# Patient Record
Sex: FEMALE | Race: WHITE | URBAN - NONMETROPOLITAN AREA
[De-identification: names, ages, dates, MRNs, and addresses within clinical notes are randomized per-mention and may not be internally consistent; named-entity substitution may affect disease eponyms.]

---

## 2023-06-05 ENCOUNTER — OFFICE VISIT (OUTPATIENT)
Age: 23
End: 2023-06-05
Payer: MEDICAID

## 2023-06-05 VITALS — WEIGHT: 228 LBS | TEMPERATURE: 97.5 F

## 2023-06-05 DIAGNOSIS — H72.91 PERFORATION OF RIGHT TYMPANIC MEMBRANE: Primary | ICD-10-CM

## 2023-06-05 PROCEDURE — 99213 OFFICE O/P EST LOW 20 MIN: CPT | Performed by: NURSE PRACTITIONER

## 2023-06-05 RX ORDER — ACETAMINOPHEN AND CODEINE PHOSPHATE 120; 12 MG/5ML; MG/5ML
SOLUTION ORAL
COMMUNITY
Start: 2023-03-07

## 2023-06-05 RX ORDER — METFORMIN HYDROCHLORIDE 500 MG/1
TABLET, EXTENDED RELEASE ORAL
COMMUNITY
Start: 2023-04-06

## 2023-06-05 RX ORDER — AMOXICILLIN AND CLAVULANATE POTASSIUM 875; 125 MG/1; MG/1
1 TABLET, FILM COATED ORAL 2 TIMES DAILY
Qty: 20 TABLET | Refills: 0 | Status: SHIPPED | OUTPATIENT
Start: 2023-06-05 | End: 2023-06-15

## 2023-06-05 RX ORDER — ORAL SEMAGLUTIDE 3 MG/1
TABLET ORAL
COMMUNITY
Start: 2023-05-08

## 2023-06-05 RX ORDER — MULTIVIT-MIN/IRON/FOLIC ACID/K 18-600-40
CAPSULE ORAL
COMMUNITY

## 2023-06-05 RX ORDER — CETIRIZINE HYDROCHLORIDE 10 MG/1
TABLET ORAL
COMMUNITY
Start: 2023-03-13

## 2023-06-05 RX ORDER — LEVOTHYROXINE SODIUM 137 UG/1
TABLET ORAL
COMMUNITY
Start: 2023-03-13

## 2023-06-05 RX ORDER — OFLOXACIN 3 MG/ML
5 SOLUTION AURICULAR (OTIC) 2 TIMES DAILY
Qty: 3.5 ML | Refills: 0 | Status: SHIPPED | OUTPATIENT
Start: 2023-06-05 | End: 2023-06-12

## 2023-06-05 ASSESSMENT — ENCOUNTER SYMPTOMS
VOICE CHANGE: 0
CHOKING: 0
CHEST TIGHTNESS: 0
WHEEZING: 0
COLOR CHANGE: 0
COUGH: 0
TROUBLE SWALLOWING: 0
STRIDOR: 0
SHORTNESS OF BREATH: 0
RHINORRHEA: 0
SINUS PRESSURE: 0
SORE THROAT: 0
EYES NEGATIVE: 1
GASTROINTESTINAL NEGATIVE: 1

## 2023-06-05 NOTE — PROGRESS NOTES
Not on File    Health Maintenance   Topic Date Due    Varicella vaccine (1 of 2 - 2-dose childhood series) Never done    HPV vaccine (1 - 2-dose series) Never done    Depression Screen  Never done    HIV screen  Never done    Hepatitis C screen  Never done    DTaP/Tdap/Td vaccine (1 - Tdap) Never done    Pap smear  Never done    COVID-19 Vaccine (3 - Booster for Pfizer series) 03/15/2022    Flu vaccine (Season Ended) 08/01/2023    Hepatitis A vaccine  Aged Out    Hib vaccine  Aged Out    Meningococcal (ACWY) vaccine  Aged Out    Pneumococcal 0-64 years Vaccine  Aged Out       Subjective:     Review of Systems   Constitutional: Negative. Negative for activity change, appetite change, fatigue and fever. HENT:  Positive for ear discharge, ear pain and hearing loss. Negative for congestion, rhinorrhea, sinus pressure, sore throat, trouble swallowing and voice change. Eyes: Negative. Respiratory:  Negative for cough, choking, chest tightness, shortness of breath, wheezing and stridor. Cardiovascular: Negative. Gastrointestinal: Negative. Endocrine: Negative. Genitourinary: Negative. Musculoskeletal: Negative. Skin:  Negative for color change. Neurological: Negative. Hematological: Negative. Psychiatric/Behavioral: Negative. Objective:     Physical Exam  Vitals and nursing note reviewed. Constitutional:       General: She is not in acute distress. Appearance: Normal appearance. She is well-developed. She is not ill-appearing or toxic-appearing. HENT:      Head: Normocephalic and atraumatic. Right Ear: Hearing, ear canal and external ear normal. Tympanic membrane is perforated. Left Ear: Hearing, tympanic membrane, ear canal and external ear normal.      Nose: Nose normal.      Right Sinus: No maxillary sinus tenderness or frontal sinus tenderness. Left Sinus: No maxillary sinus tenderness or frontal sinus tenderness. Mouth/Throat:      Lips: Pink.

## 2023-06-06 ENCOUNTER — OFFICE VISIT (OUTPATIENT)
Dept: ENT CLINIC | Age: 23
End: 2023-06-06
Payer: MEDICAID

## 2023-06-06 VITALS
BODY MASS INDEX: 45.76 KG/M2 | SYSTOLIC BLOOD PRESSURE: 188 MMHG | WEIGHT: 227 LBS | DIASTOLIC BLOOD PRESSURE: 66 MMHG | HEIGHT: 59 IN

## 2023-06-06 DIAGNOSIS — H61.23 BILATERAL IMPACTED CERUMEN: Primary | ICD-10-CM

## 2023-06-06 PROCEDURE — 69210 REMOVE IMPACTED EAR WAX UNI: CPT | Performed by: PHYSICIAN ASSISTANT

## 2023-06-06 PROCEDURE — 99203 OFFICE O/P NEW LOW 30 MIN: CPT | Performed by: PHYSICIAN ASSISTANT

## 2023-06-06 ASSESSMENT — ENCOUNTER SYMPTOMS
RHINORRHEA: 0
SORE THROAT: 0
FACIAL SWELLING: 0
EYE DISCHARGE: 0
TROUBLE SWALLOWING: 0
SINUS PRESSURE: 0
SINUS PAIN: 0
EYE PAIN: 0
VOICE CHANGE: 0

## 2023-06-06 NOTE — PROGRESS NOTES
hearing loss, nosebleeds, postnasal drip, rhinorrhea, sinus pressure, sinus pain, sneezing, sore throat, tinnitus, trouble swallowing and voice change. Eyes:  Negative for pain and discharge. Neurological:  Negative for dizziness and headaches. Comments:     PHYSICAL EXAM:    BP (!) 188/66   Ht 4' 11\" (1.499 m)   Wt 227 lb (103 kg)   BMI 45.85 kg/m²   Body mass index is 45.85 kg/m². General Appearance: well developed  and well nourished  Head/ Face: normocephalic and atraumatic  Vocal Quality: good/ normal  Ears: Right Ear: External: external ears normal Otoscopy Ear Canal: cerumen impaction Otoscopy TM: TM's normal and TM's mobile Left Ear: External: external ears normal Otoscopy Ear Canal: cerumen Otoscopy TM: TM's normal and TM's mobile  Hearing: grossly intact  Nose: nares normal and septum midline  Neck: supple and adenopathy none palpable  Thyroid: normal  Oral exam demonstrated the tongue to be midline with no abnormalities to the posterior pharynx. Assessment & Plan:    Problem List Items Addressed This Visit       Bilateral impacted cerumen - Primary     Bilateral cerumen impaction-successfully removed with microscopic guidance-no evidence of a TM perforation noted after disimpaction  Plan: I recommended the patient to see me every 6 months for cerumen check due to her history. They were reminded to call if they have any questions or problems. Due to the patient having no evidence of a perforation, I advised the patient that they can stop the antibiotic therapy. Relevant Orders    94107 - [place] REMOVE IMPACTED EAR WAX (Completed)       Orders Placed This Encounter   Procedures    80930 - [place] REMOVE IMPACTED EAR WAX     With microscopic guidance, the cerumen impaction was removed from the right ear with suction without any complications after disimpaction there was no evidence of a TM perforation or infection.   Examination left ear demonstrated cerumen to be present that was

## 2023-06-06 NOTE — ASSESSMENT & PLAN NOTE
Bilateral cerumen impaction-successfully removed with microscopic guidance-no evidence of a TM perforation noted after disimpaction  Plan: I recommended the patient to see me every 6 months for cerumen check due to her history. They were reminded to call if they have any questions or problems. Due to the patient having no evidence of a perforation, I advised the patient that they can stop the antibiotic therapy.

## 2023-06-19 ENCOUNTER — TELEPHONE (OUTPATIENT)
Dept: ENT CLINIC | Age: 23
End: 2023-06-19

## 2023-06-19 NOTE — TELEPHONE ENCOUNTER
Patient parent Laurie Torres requesting return call from nurse. Stated patient is still having issue with right ear. Please return her call to discuss [phone]    Thank you         I called and left a message on Gita's cell phone voicemail that if she is still having issues with her ear she can utilize the antibiotic drops that was previously prescribed by urgent care. Overall she had a straightforward cerumen disimpaction and was noted with no infection at the time. She was reminded to call if she has any questions or problems.       Electronically signed by Jeff Davis PA-C on 6/19/23 at 6:56 PM CDT

## 2023-12-04 ENCOUNTER — TRANSCRIBE ORDERS (OUTPATIENT)
Dept: ADMINISTRATIVE | Facility: HOSPITAL | Age: 23
End: 2023-12-04
Payer: COMMERCIAL

## 2023-12-04 ENCOUNTER — LAB (OUTPATIENT)
Dept: LAB | Facility: HOSPITAL | Age: 23
End: 2023-12-04
Payer: COMMERCIAL

## 2023-12-04 DIAGNOSIS — D75.1 POLYCYTHEMIA, SECONDARY: ICD-10-CM

## 2023-12-04 DIAGNOSIS — E88.819 INSULIN RESISTANCE: ICD-10-CM

## 2023-12-04 DIAGNOSIS — Z00.00 ROUTINE GENERAL MEDICAL EXAMINATION AT A HEALTH CARE FACILITY: ICD-10-CM

## 2023-12-04 DIAGNOSIS — E11.9 DIABETES MELLITUS WITHOUT COMPLICATION: ICD-10-CM

## 2023-12-04 DIAGNOSIS — Z00.00 ROUTINE GENERAL MEDICAL EXAMINATION AT A HEALTH CARE FACILITY: Primary | ICD-10-CM

## 2023-12-04 DIAGNOSIS — E55.9 VITAMIN D DEFICIENCY DISEASE: ICD-10-CM

## 2023-12-04 LAB
25(OH)D3 SERPL-MCNC: 54.9 NG/ML (ref 30–100)
ALBUMIN SERPL-MCNC: 3.8 G/DL (ref 3.5–5.2)
ALBUMIN UR-MCNC: 1.7 MG/DL
ALBUMIN/GLOB SERPL: 1.2 G/DL
ALP SERPL-CCNC: 104 U/L (ref 39–117)
ALT SERPL W P-5'-P-CCNC: 17 U/L (ref 1–33)
ANION GAP SERPL CALCULATED.3IONS-SCNC: 8 MMOL/L (ref 5–15)
AST SERPL-CCNC: 20 U/L (ref 1–32)
BASOPHILS # BLD AUTO: 0.04 10*3/MM3 (ref 0–0.2)
BASOPHILS NFR BLD AUTO: 1.4 % (ref 0–1.5)
BILIRUB SERPL-MCNC: 0.5 MG/DL (ref 0–1.2)
BUN SERPL-MCNC: 15 MG/DL (ref 6–20)
BUN/CREAT SERPL: 16.9 (ref 7–25)
CALCIUM SPEC-SCNC: 9.3 MG/DL (ref 8.6–10.5)
CHLORIDE SERPL-SCNC: 102 MMOL/L (ref 98–107)
CHOLEST SERPL-MCNC: 175 MG/DL (ref 0–200)
CO2 SERPL-SCNC: 28 MMOL/L (ref 22–29)
CREAT SERPL-MCNC: 0.89 MG/DL (ref 0.57–1)
CREAT UR-MCNC: 190.2 MG/DL
DEPRECATED RDW RBC AUTO: 48.4 FL (ref 37–54)
EGFRCR SERPLBLD CKD-EPI 2021: 92.4 ML/MIN/1.73
EOSINOPHIL # BLD AUTO: 0.02 10*3/MM3 (ref 0–0.4)
EOSINOPHIL NFR BLD AUTO: 0.7 % (ref 0.3–6.2)
ERYTHROCYTE [DISTWIDTH] IN BLOOD BY AUTOMATED COUNT: 13.1 % (ref 12.3–15.4)
FERRITIN SERPL-MCNC: 15.12 NG/ML (ref 13–150)
GLOBULIN UR ELPH-MCNC: 3.3 GM/DL
GLUCOSE SERPL-MCNC: 89 MG/DL (ref 65–99)
HBA1C MFR BLD: 5.3 % (ref 4.8–5.6)
HCT VFR BLD AUTO: 47.9 % (ref 34–46.6)
HDLC SERPL-MCNC: 49 MG/DL (ref 40–60)
HGB BLD-MCNC: 15.3 G/DL (ref 12–15.9)
IMM GRANULOCYTES # BLD AUTO: 0.01 10*3/MM3 (ref 0–0.05)
IMM GRANULOCYTES NFR BLD AUTO: 0.3 % (ref 0–0.5)
IRON 24H UR-MRATE: 102 MCG/DL (ref 37–145)
IRON SATN MFR SERPL: 23 % (ref 20–50)
LDLC SERPL CALC-MCNC: 113 MG/DL (ref 0–100)
LDLC/HDLC SERPL: 2.3 {RATIO}
LYMPHOCYTES # BLD AUTO: 0.89 10*3/MM3 (ref 0.7–3.1)
LYMPHOCYTES NFR BLD AUTO: 30.9 % (ref 19.6–45.3)
MCH RBC QN AUTO: 31.9 PG (ref 26.6–33)
MCHC RBC AUTO-ENTMCNC: 31.9 G/DL (ref 31.5–35.7)
MCV RBC AUTO: 100 FL (ref 79–97)
MICROALBUMIN/CREAT UR: 8.9 MG/G (ref 0–29)
MONOCYTES # BLD AUTO: 0.19 10*3/MM3 (ref 0.1–0.9)
MONOCYTES NFR BLD AUTO: 6.6 % (ref 5–12)
NEUTROPHILS NFR BLD AUTO: 1.73 10*3/MM3 (ref 1.7–7)
NEUTROPHILS NFR BLD AUTO: 60.1 % (ref 42.7–76)
NRBC BLD AUTO-RTO: 0 /100 WBC (ref 0–0.2)
PLATELET # BLD AUTO: 244 10*3/MM3 (ref 140–450)
PMV BLD AUTO: 9.6 FL (ref 6–12)
POTASSIUM SERPL-SCNC: 4.1 MMOL/L (ref 3.5–5.2)
PROT SERPL-MCNC: 7.1 G/DL (ref 6–8.5)
RBC # BLD AUTO: 4.79 10*6/MM3 (ref 3.77–5.28)
SODIUM SERPL-SCNC: 138 MMOL/L (ref 136–145)
TIBC SERPL-MCNC: 451 MCG/DL (ref 298–536)
TRANSFERRIN SERPL-MCNC: 303 MG/DL (ref 200–360)
TRIGL SERPL-MCNC: 67 MG/DL (ref 0–150)
TSH SERPL DL<=0.05 MIU/L-ACNC: 0.61 UIU/ML (ref 0.27–4.2)
VLDLC SERPL-MCNC: 13 MG/DL (ref 5–40)
WBC NRBC COR # BLD AUTO: 2.88 10*3/MM3 (ref 3.4–10.8)

## 2023-12-04 PROCEDURE — 80050 GENERAL HEALTH PANEL: CPT

## 2023-12-04 PROCEDURE — 82728 ASSAY OF FERRITIN: CPT

## 2023-12-04 PROCEDURE — 80061 LIPID PANEL: CPT

## 2023-12-04 PROCEDURE — 84466 ASSAY OF TRANSFERRIN: CPT

## 2023-12-04 PROCEDURE — 83036 HEMOGLOBIN GLYCOSYLATED A1C: CPT

## 2023-12-04 PROCEDURE — 82043 UR ALBUMIN QUANTITATIVE: CPT

## 2023-12-04 PROCEDURE — 36415 COLL VENOUS BLD VENIPUNCTURE: CPT

## 2023-12-04 PROCEDURE — 83525 ASSAY OF INSULIN: CPT

## 2023-12-04 PROCEDURE — 82306 VITAMIN D 25 HYDROXY: CPT

## 2023-12-04 PROCEDURE — 82570 ASSAY OF URINE CREATININE: CPT

## 2023-12-04 PROCEDURE — 82668 ASSAY OF ERYTHROPOIETIN: CPT

## 2023-12-04 PROCEDURE — 83540 ASSAY OF IRON: CPT

## 2023-12-05 LAB
EPO SERPL-ACNC: 19.2 MIU/ML (ref 2.6–18.5)
INSULIN SERPL-ACNC: 10.7 UIU/ML (ref 2.6–24.9)

## 2023-12-13 ENCOUNTER — OFFICE VISIT (OUTPATIENT)
Dept: OBSTETRICS AND GYNECOLOGY | Facility: CLINIC | Age: 23
End: 2023-12-13
Payer: COMMERCIAL

## 2023-12-13 VITALS
BODY MASS INDEX: 44.35 KG/M2 | DIASTOLIC BLOOD PRESSURE: 80 MMHG | SYSTOLIC BLOOD PRESSURE: 132 MMHG | WEIGHT: 220 LBS | HEIGHT: 59 IN

## 2023-12-13 DIAGNOSIS — N94.89 MENSTRUAL SUPPRESSION: Primary | ICD-10-CM

## 2023-12-13 DIAGNOSIS — Q90.9 DOWN SYNDROME: ICD-10-CM

## 2023-12-13 DIAGNOSIS — Z71.89 COUNSELING ON HEALTH PROMOTION AND DISEASE PREVENTION: ICD-10-CM

## 2023-12-13 RX ORDER — LEVOTHYROXINE SODIUM 137 UG/1
1 TABLET ORAL DAILY
COMMUNITY

## 2023-12-13 RX ORDER — NAPHAZOLINE HCL/GLYCERIN 0.012-0.2%
1 DROPS OPHTHALMIC (EYE)
COMMUNITY

## 2023-12-13 RX ORDER — ORAL SEMAGLUTIDE 3 MG/1
1 TABLET ORAL DAILY
COMMUNITY
Start: 2023-10-13

## 2023-12-13 RX ORDER — CETIRIZINE HYDROCHLORIDE 10 MG/1
TABLET ORAL
COMMUNITY
Start: 2023-09-13

## 2023-12-13 RX ORDER — ACETAMINOPHEN AND CODEINE PHOSPHATE 120; 12 MG/5ML; MG/5ML
0.7 SOLUTION ORAL DAILY
COMMUNITY
Start: 2023-09-13 | End: 2023-12-13 | Stop reason: SDUPTHER

## 2023-12-13 RX ORDER — ACETAMINOPHEN AND CODEINE PHOSPHATE 120; 12 MG/5ML; MG/5ML
SOLUTION ORAL
Qty: 168 TABLET | Refills: 4 | Status: SHIPPED | OUTPATIENT
Start: 2023-12-13

## 2023-12-13 RX ORDER — METFORMIN HYDROCHLORIDE 500 MG/1
1 TABLET, EXTENDED RELEASE ORAL DAILY
COMMUNITY

## 2023-12-13 RX ORDER — FLUTICASONE PROPIONATE 50 MCG
SPRAY, SUSPENSION (ML) NASAL
COMMUNITY

## 2023-12-13 NOTE — PROGRESS NOTES
Chief Complaint   Patient presents with    [facility] patient here for birth control surveillance. Patient has never had a pap. Patient is not sexually active.        History:  Cynthia Guzman is a 23 y.o. female who presents today for follow-up for evaluation of the above:  Pt comes in today for refills on birth control. Continues to do well on current regimen- has no period. Wishes to continue same. Pt is not sexually active.         ROS:  Review of Systems   Constitutional:  Negative for activity change and unexpected weight loss.   HENT:  Negative for congestion.    Cardiovascular:  Negative for chest pain.   Gastrointestinal:  Negative for blood in stool, constipation and diarrhea.   Endocrine: Negative for cold intolerance and heat intolerance.   Genitourinary:  Negative for dyspareunia, pelvic pain and vaginal discharge.   Musculoskeletal:  Negative for arthralgias, back pain, neck pain and neck stiffness.   Skin:  Negative for rash.   Neurological:  Negative for dizziness and headache.   Psychiatric/Behavioral:  Negative for sleep disturbance. The patient is not nervous/anxious.        Ms. Guzman  reports that she has never smoked. She does not have any smokeless tobacco history on file. She reports that she does not drink alcohol and does not use drugs.      Current Outpatient Medications:     cetirizine (zyrTEC) 10 MG tablet, , Disp: , Rfl:     Cholecalciferol 50 MCG (2000 UT) capsule, Take  by mouth., Disp: , Rfl:     fluticasone (FLONASE) 50 MCG/ACT nasal spray, 1 SPRAY DAILY, Disp: , Rfl:     levothyroxine (SYNTHROID, LEVOTHROID) 137 MCG tablet, 1 tablet Daily., Disp: , Rfl:     metFORMIN ER (GLUCOPHAGE-XR) 500 MG 24 hr tablet, 1 tablet Daily., Disp: , Rfl:     norethindrone (MICRONOR) 0.35 MG tablet, Take 2 tablets daily., Disp: 168 tablet, Rfl: 4    Probiotic Product (Super Probiotic) capsule, Take 1 capsule by mouth., Disp: , Rfl:     Rybelsus 3 MG tablet, 1 tablet Daily., Disp: , Rfl:  "      OBJECTIVE:  /80 (BP Location: Left arm, Patient Position: Sitting, Cuff Size: Adult)   Ht 149.9 cm (59\")   Wt 99.8 kg (220 lb)   LMP  (LMP Unknown)   BMI 44.43 kg/m²    Physical Exam  Vitals and nursing note reviewed.   Constitutional:       Appearance: She is well-developed.   HENT:      Head: Normocephalic and atraumatic.   Eyes:      General:         Right eye: No discharge.         Left eye: No discharge.      Conjunctiva/sclera: Conjunctivae normal.   Neck:      Thyroid: No thyromegaly.   Cardiovascular:      Rate and Rhythm: Normal rate and regular rhythm.      Heart sounds: Normal heart sounds. No murmur heard.  Pulmonary:      Effort: Pulmonary effort is normal.      Breath sounds: Normal breath sounds.   Musculoskeletal:      Cervical back: Normal range of motion and neck supple.   Skin:     General: Skin is warm and dry.   Neurological:      Mental Status: She is alert and oriented to person, place, and time.   Psychiatric:         Mood and Affect: Mood normal.         Behavior: Behavior normal.         Thought Content: Thought content normal.         Judgment: Judgment normal.         Assessment/Plan    Diagnoses and all orders for this visit:    1. Menstrual suppression (Primary)  -     norethindrone (MICRONOR) 0.35 MG tablet; Take 2 tablets daily.  Dispense: 168 tablet; Refill: 4    2. Down syndrome    3. Counseling on health promotion and disease prevention    Pt comes in today for refills on micronor. Has been on this for several years for menstrual suppression. Has no periods with it. Pt is not sexually active.     Pt previous GYN attempted pap a few years ago but it did not go well. Pt very anxious about this. Mother is not really wanting to make pt get pap. Educated on what pap and exam screens for. Mom states that pt is with her constantly and could not be sexually active. Discussed ovarian screening as part of that exam as well. At this time, since pt isn't having any issues and " pt isn't sexually active, mom and pt would rather hold off on exam. If she were to ever have any problems, then this may be required at that time. Will readdress yearly.        An After Visit Summary was printed and given to the patient at discharge.  Return in about 1 year (around 12/13/2024) for Recheck med. Sooner if problems arise.          SHERON Mckeon  Electronically Signed

## 2023-12-21 ENCOUNTER — APPOINTMENT (OUTPATIENT)
Dept: CT IMAGING | Facility: HOSPITAL | Age: 23
End: 2023-12-21
Payer: COMMERCIAL

## 2023-12-21 ENCOUNTER — HOSPITAL ENCOUNTER (EMERGENCY)
Facility: HOSPITAL | Age: 23
Discharge: HOME | End: 2023-12-21
Attending: INTERNAL MEDICINE
Payer: COMMERCIAL

## 2023-12-21 VITALS
OXYGEN SATURATION: 100 % | WEIGHT: 220 LBS | HEIGHT: 59 IN | DIASTOLIC BLOOD PRESSURE: 67 MMHG | BODY MASS INDEX: 44.35 KG/M2 | HEART RATE: 81 BPM | TEMPERATURE: 97.2 F | SYSTOLIC BLOOD PRESSURE: 108 MMHG | RESPIRATION RATE: 16 BRPM

## 2023-12-21 DIAGNOSIS — R10.13 EPIGASTRIC PAIN: Primary | ICD-10-CM

## 2023-12-21 DIAGNOSIS — K52.9 GASTROENTERITIS: ICD-10-CM

## 2023-12-21 DIAGNOSIS — R55 NEAR SYNCOPE: ICD-10-CM

## 2023-12-21 LAB
ALBUMIN SERPL-MCNC: 3.8 G/DL (ref 3.5–5.2)
ALBUMIN/GLOB SERPL: 1.3 G/DL
ALP SERPL-CCNC: 105 U/L (ref 39–117)
ALT SERPL W P-5'-P-CCNC: 16 U/L (ref 1–33)
ANION GAP SERPL CALCULATED.3IONS-SCNC: 10 MMOL/L (ref 5–15)
AST SERPL-CCNC: 20 U/L (ref 1–32)
BASOPHILS # BLD AUTO: 0.04 10*3/MM3 (ref 0–0.2)
BASOPHILS NFR BLD AUTO: 0.8 % (ref 0–1.5)
BILIRUB SERPL-MCNC: 0.5 MG/DL (ref 0–1.2)
BILIRUB UR QL STRIP: NEGATIVE
BUN SERPL-MCNC: 15 MG/DL (ref 6–20)
BUN/CREAT SERPL: 16.1 (ref 7–25)
CALCIUM SPEC-SCNC: 8.9 MG/DL (ref 8.6–10.5)
CHLORIDE SERPL-SCNC: 102 MMOL/L (ref 98–107)
CLARITY UR: CLEAR
CO2 SERPL-SCNC: 25 MMOL/L (ref 22–29)
COLOR UR: YELLOW
CREAT SERPL-MCNC: 0.93 MG/DL (ref 0.57–1)
CRP SERPL-MCNC: <0.3 MG/DL (ref 0–0.5)
D DIMER PPP FEU-MCNC: 0.42 MCGFEU/ML (ref 0–0.5)
D-LACTATE SERPL-SCNC: 1.5 MMOL/L (ref 0.5–2)
DEPRECATED RDW RBC AUTO: 46.2 FL (ref 37–54)
EGFRCR SERPLBLD CKD-EPI 2021: 87.7 ML/MIN/1.73
EOSINOPHIL # BLD AUTO: 0 10*3/MM3 (ref 0–0.4)
EOSINOPHIL NFR BLD AUTO: 0 % (ref 0.3–6.2)
ERYTHROCYTE [DISTWIDTH] IN BLOOD BY AUTOMATED COUNT: 12.9 % (ref 12.3–15.4)
GLOBULIN UR ELPH-MCNC: 3 GM/DL
GLUCOSE SERPL-MCNC: 96 MG/DL (ref 65–99)
GLUCOSE UR STRIP-MCNC: NEGATIVE MG/DL
HCG SERPL QL: NEGATIVE
HCT VFR BLD AUTO: 46.7 % (ref 34–46.6)
HGB BLD-MCNC: 15.2 G/DL (ref 12–15.9)
HGB UR QL STRIP.AUTO: NEGATIVE
IMM GRANULOCYTES # BLD AUTO: 0.01 10*3/MM3 (ref 0–0.05)
IMM GRANULOCYTES NFR BLD AUTO: 0.2 % (ref 0–0.5)
KETONES UR QL STRIP: NEGATIVE
LDH SERPL-CCNC: 220 U/L (ref 135–214)
LEUKOCYTE ESTERASE UR QL STRIP.AUTO: NEGATIVE
LIPASE SERPL-CCNC: 25 U/L (ref 13–60)
LYMPHOCYTES # BLD AUTO: 0.61 10*3/MM3 (ref 0.7–3.1)
LYMPHOCYTES NFR BLD AUTO: 12.8 % (ref 19.6–45.3)
MAGNESIUM SERPL-MCNC: 2.1 MG/DL (ref 1.6–2.6)
MCH RBC QN AUTO: 31.8 PG (ref 26.6–33)
MCHC RBC AUTO-ENTMCNC: 32.5 G/DL (ref 31.5–35.7)
MCV RBC AUTO: 97.7 FL (ref 79–97)
MONOCYTES # BLD AUTO: 0.29 10*3/MM3 (ref 0.1–0.9)
MONOCYTES NFR BLD AUTO: 6.1 % (ref 5–12)
NEUTROPHILS NFR BLD AUTO: 3.83 10*3/MM3 (ref 1.7–7)
NEUTROPHILS NFR BLD AUTO: 80.1 % (ref 42.7–76)
NITRITE UR QL STRIP: NEGATIVE
NRBC BLD AUTO-RTO: 0 /100 WBC (ref 0–0.2)
PH UR STRIP.AUTO: 8 [PH] (ref 5–8)
PLATELET # BLD AUTO: 236 10*3/MM3 (ref 140–450)
PMV BLD AUTO: 10 FL (ref 6–12)
POTASSIUM SERPL-SCNC: 4.1 MMOL/L (ref 3.5–5.2)
PROCALCITONIN SERPL-MCNC: <0.02 NG/ML (ref 0–0.25)
PROT SERPL-MCNC: 6.8 G/DL (ref 6–8.5)
PROT UR QL STRIP: NEGATIVE
QT INTERVAL: 372 MS
QTC INTERVAL: 407 MS
RBC # BLD AUTO: 4.78 10*6/MM3 (ref 3.77–5.28)
SODIUM SERPL-SCNC: 137 MMOL/L (ref 136–145)
SP GR UR STRIP: 1.01 (ref 1–1.03)
UROBILINOGEN UR QL STRIP: NORMAL
WBC NRBC COR # BLD AUTO: 4.78 10*3/MM3 (ref 3.4–10.8)

## 2023-12-21 PROCEDURE — 83615 LACTATE (LD) (LDH) ENZYME: CPT | Performed by: EMERGENCY MEDICINE

## 2023-12-21 PROCEDURE — 85025 COMPLETE CBC W/AUTO DIFF WBC: CPT

## 2023-12-21 PROCEDURE — 93005 ELECTROCARDIOGRAM TRACING: CPT

## 2023-12-21 PROCEDURE — 99285 EMERGENCY DEPT VISIT HI MDM: CPT

## 2023-12-21 PROCEDURE — 96374 THER/PROPH/DIAG INJ IV PUSH: CPT

## 2023-12-21 PROCEDURE — 83690 ASSAY OF LIPASE: CPT

## 2023-12-21 PROCEDURE — 25010000002 ONDANSETRON PER 1 MG

## 2023-12-21 PROCEDURE — 83735 ASSAY OF MAGNESIUM: CPT | Performed by: EMERGENCY MEDICINE

## 2023-12-21 PROCEDURE — 96361 HYDRATE IV INFUSION ADD-ON: CPT

## 2023-12-21 PROCEDURE — 25510000001 IOPAMIDOL 61 % SOLUTION: Performed by: EMERGENCY MEDICINE

## 2023-12-21 PROCEDURE — 84703 CHORIONIC GONADOTROPIN ASSAY: CPT | Performed by: EMERGENCY MEDICINE

## 2023-12-21 PROCEDURE — 85379 FIBRIN DEGRADATION QUANT: CPT | Performed by: EMERGENCY MEDICINE

## 2023-12-21 PROCEDURE — 74177 CT ABD & PELVIS W/CONTRAST: CPT

## 2023-12-21 PROCEDURE — 25810000003 SODIUM CHLORIDE 0.9 % SOLUTION

## 2023-12-21 PROCEDURE — 84145 PROCALCITONIN (PCT): CPT | Performed by: EMERGENCY MEDICINE

## 2023-12-21 PROCEDURE — 80053 COMPREHEN METABOLIC PANEL: CPT

## 2023-12-21 PROCEDURE — 81003 URINALYSIS AUTO W/O SCOPE: CPT | Performed by: EMERGENCY MEDICINE

## 2023-12-21 PROCEDURE — 83605 ASSAY OF LACTIC ACID: CPT

## 2023-12-21 PROCEDURE — 86140 C-REACTIVE PROTEIN: CPT | Performed by: EMERGENCY MEDICINE

## 2023-12-21 RX ORDER — ONDANSETRON 2 MG/ML
4 INJECTION INTRAMUSCULAR; INTRAVENOUS ONCE
Status: COMPLETED | OUTPATIENT
Start: 2023-12-21 | End: 2023-12-21

## 2023-12-21 RX ORDER — FAMOTIDINE 10 MG/ML
20 INJECTION, SOLUTION INTRAVENOUS ONCE
Status: DISCONTINUED | OUTPATIENT
Start: 2023-12-21 | End: 2023-12-21

## 2023-12-21 RX ORDER — PANTOPRAZOLE SODIUM 40 MG/1
40 TABLET, DELAYED RELEASE ORAL DAILY
Qty: 15 TABLET | Refills: 0 | Status: SHIPPED | OUTPATIENT
Start: 2023-12-21 | End: 2024-01-05

## 2023-12-21 RX ORDER — HYDROMORPHONE HYDROCHLORIDE 1 MG/ML
0.5 INJECTION, SOLUTION INTRAMUSCULAR; INTRAVENOUS; SUBCUTANEOUS ONCE
Status: DISCONTINUED | OUTPATIENT
Start: 2023-12-21 | End: 2023-12-21

## 2023-12-21 RX ORDER — SODIUM CHLORIDE 0.9 % (FLUSH) 0.9 %
10 SYRINGE (ML) INJECTION AS NEEDED
Status: DISCONTINUED | OUTPATIENT
Start: 2023-12-21 | End: 2023-12-21 | Stop reason: HOSPADM

## 2023-12-21 RX ORDER — KETOROLAC TROMETHAMINE 15 MG/ML
15 INJECTION, SOLUTION INTRAMUSCULAR; INTRAVENOUS ONCE
Status: DISCONTINUED | OUTPATIENT
Start: 2023-12-21 | End: 2023-12-21

## 2023-12-21 RX ADMIN — IOPAMIDOL 100 ML: 612 INJECTION, SOLUTION INTRAVENOUS at 12:02

## 2023-12-21 RX ADMIN — ONDANSETRON 4 MG: 2 INJECTION INTRAMUSCULAR; INTRAVENOUS at 10:54

## 2023-12-21 RX ADMIN — SODIUM CHLORIDE 1000 ML: 9 INJECTION, SOLUTION INTRAVENOUS at 10:54

## 2023-12-21 NOTE — ED PROVIDER NOTES
Subjective   History of Present Illness  This patient is a 22 y/o female with past medical history Down syndrome and insulin resistance who presents to the ER for evaluation of epigastric pain and syncopal episode x 1. Patient herself is a poor historian due to history of Down syndrome, so HPI was obtained mostly from parents.  Parents report that they found her in the bathroom passed out, leaning against the wall on her left side.  They were able to get her up and patient slowly regained consciousness.  Mother reports that patient does have a history of vasovagal syncope, usually related to GI issues.  She noted several episodes of diarrhea this morning.  Patient is also now complaining of epigastric pain.   Mother reports that patient appears more drowsy than usual although she is alert and oriented.  Denies any cardiac history.  The last time patient had a syncopal episode was in May.  Denies fever or chills.  Denies any recent sick contacts. No chest pain or shortness of breath. Patient denies any pain with palpation of her scalp, head, or neck. No ecchymosis or abrasions noted. Mentation is currently baseline per mother.     History provided by:  Caregiver  History limited by: Down syndrome.      Review of Systems   Constitutional:  Negative for fever.   HENT: Negative.     Respiratory: Negative.  Negative for shortness of breath.    Cardiovascular: Negative.  Negative for chest pain.   Gastrointestinal:  Positive for abdominal pain, diarrhea and nausea.   Genitourinary: Negative.    Neurological:  Positive for syncope (X1 this morning).       Past Medical History:   Diagnosis Date    Anxiety     Down syndrome     Insulin resistance     Vasovagal syncope        No Known Allergies    Past Surgical History:   Procedure Laterality Date    ADENOIDECTOMY      EAR TUBES      TONSILLECTOMY         Family History   Problem Relation Age of Onset    Hypertension Father        Social History     Socioeconomic History     Marital status: Single   Tobacco Use    Smoking status: Never   Substance and Sexual Activity    Alcohol use: Never    Drug use: Never    Sexual activity: Never           Objective   Physical Exam  Constitutional:       Appearance: She is well-developed. She is obese.   Cardiovascular:      Rate and Rhythm: Normal rate and regular rhythm.      Heart sounds: Normal heart sounds. No murmur heard.  Pulmonary:      Effort: Pulmonary effort is normal. No respiratory distress.      Breath sounds: Normal breath sounds.   Abdominal:      General: Abdomen is flat. Bowel sounds are normal. There is no distension.      Palpations: Abdomen is soft.      Tenderness: There is abdominal tenderness in the epigastric area.   Skin:     General: Skin is warm and dry.      Capillary Refill: Capillary refill takes less than 2 seconds.   Neurological:      Mental Status: She is alert.      Comments: Baseline per mother   Psychiatric:         Mood and Affect: Mood is anxious.         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  IV access can be obtained lab work was given performed.  Hemodynamically she appears to be stable. [TS]   1134 Patient sitting up on the stretcher, appears comfortable, denies nausea at this time. Vitals stable on monitor. Pending further labs and CT [DF]   1149 LDH(!): 220 [DF]   1154 Normal sinus rhythm no evidence of acute ischemia baseline artifact [TS]   1256 Patient's workup essentially is unremarkable.  There is no acute abdominal pathology.  CT of the abdomen pelvis is negative.  I have discussed this case at length with the patient and with the family she is sitting in a chair outside the bed.  Wants to go home.  I have discussed this with and the patient will discharge home with a follow-up with the  primary MD and to return there for any worsening symptoms. [TS]   1257 Patient had episode of syncope/near syncope.  This could be a vasovagal phenomenon.  Dimer test is negative.  PERC score is low.  The patient was discharged home. [TS]   1258 Patient presented with near syncope/syncope patient was placed on the monitor IV access established EKG was obtained and did not reveal any malignant/unstable dysrhythmias, any acute ST elevation, any evidence of Brugada, or significantly prolonged QTC.  Presentation not consistent with other acute emergent cause of syncope at this time.  Low suspicion for acute PE as low risk per Wells criteria and Years criteria and no evidence of DVT such as calf swelling, tenderness, palpable tortuous lower extremity vein, or Homans signs.  No red flags for a central cause of dizziness as it was gradual in onset, has no vertical/bidirectional or nonfatigable nystagmus, and has no focal deficit on exam.  Low suspicion for dissection as there is no widened mediastinum., no hypertension, pulse deficit, and no tearing back or abdominal pain.  Low suspicion for tamponade as there is no JVD, there is no muffled heart sounds, electrical alternans on EKG, and no hypotension.  Low suspicion for pericarditis as there is no diffuse ST elevations or IL depression and the patient is afebrile.  No evidence of a GI bleed per patient's history.  Low suspicion of cardiac syncope as the patient has normal EKG no dysrhythmias and the Kent syncope rule and Burmese syncope rules are low risk     [TS]   1258 Kent syncope score is low risk  Years Algorithm for Pulmonary Embolus  To be used in hemodynamically stable patient >18 years old    No signs of DVT are present, there is no hemoptysis, PE is not the most likely diagnosis and the D-dimer is not greater or equal to 1000 ng/mL. Therefore PE is excluded . The Years algorithm rules out PE (0.43 % with symptomatic VTE during 3-month  follow-up)  PERC score is 0 [TS]   1258 Fan score 0 [TS]   1258 No clinical evidence of pancreatitis [TS]   1258 This patient presents with abdominal pain arrived hemodynamically stable.  Presentation not consistent with other acute, emergent causes of abdominal pain at this time.  Low suspicion for dissection there is no widened mediastinum, hypotension, pulses deficits, and no pain tearing through to the back.  Low suspicion for nephrolithiasis without flank pain radiating to the groin, hematuria, or any history of kidney stones.  Low suspicion for viscus perforation without evidence of guarding, rebound, or rigidity that would be concerning for an acute abdomen.  Low concern for appendicitis as pain did not radiate from the umbilicus to the right lower quadrant, negative Rovsing's sign, no severe constipation on exam.  Low concern for cholecystitis with negative Mays sign, no history of gallstones, and no recent pale stools or pain after eating.  Low concern for ulcerative disease as pain is not consistent with eating  foods and is not relieved with patient refraining from eatingand there is no hematemesis or melena. Low suspicion for GI bleeding as there is no melena hematemesis or hematochezia and patient's hemodynamics are stable and hemoglobin is at its baseline.  Low suspicion for gastroenteritis without evidence of diarrhea, fevers, or recent uncooked food exposure.  There is low concern for ischemic bowel with no significant abdominal pain normal vitals and no acidosis no history of peripheral vascular disease or cardiac dysrhythmias.         [TS]      ED Course User Index  [DF] Carmen Canales APRN  [TS] Javi Thomson MD                                             Medical Decision Making  23-year-old female presenting for evaluation of epigastric pain, diarrhea, and unwitnessed syncopal episode.  Vital signs are reassuring.  Chemistry panel shows no electrolyte abnormalities.  Lipase is normal  at 25.  Lactic normal at 1.5 and Pro-Arik normal.  CRP less than 0.3.  CBC shows no leukocytosis or anemia.  Urinalysis negative for infection.  EKG showed normal sinus rhythm with no evidence of acute ischemia.  Orthostatic vital signs were negative.  CT of the abdomen and pelvis performed which shows no acute findings.  Patient is up walking around the room, and denies any further abdominal pain, nausea, or diarrhea on reevaluation.  CT of the head and C-spine deferred due to no complaints of pain, evidence of trauma, normal mentation, and normal range of motion. Given history of vasovagal syncope related to nausea and GI upset, this is likely the cause for syncopal episode. Case discussed with Dr. Thomson (see documentation above) and decision made to discharge patient home. Return precautions given.     Problems Addressed:  Epigastric pain: complicated acute illness or injury  Gastroenteritis: complicated acute illness or injury  Syncope and collapse: complicated acute illness or injury    Amount and/or Complexity of Data Reviewed  Labs: ordered. Decision-making details documented in ED Course.  Radiology: ordered.  ECG/medicine tests: ordered.    Risk  Prescription drug management.            Final diagnoses:   Epigastric pain   Gastroenteritis   Near syncope       ED Disposition  ED Disposition       ED Disposition   Discharge    Condition   Stable    Comment   --               Aron Joel, APRN  [address] [id number] [phone]               Medication List        New Prescriptions      pantoprazole 40 MG EC tablet  Commonly known as: PROTONIX  Take 1 tablet by mouth Daily for 15 days.               Where to Get Your Medications        These medications were sent to [facility] - [facility] [address] - [phone]  - [phone] FX  [address], [facility] [address]      Phone: [phone]   pantoprazole 40 MG EC tablet            Carmen Canales  J, APRN  12/21/23 9232

## 2023-12-28 LAB
QT INTERVAL: 372 MS
QTC INTERVAL: 407 MS

## 2024-01-18 ENCOUNTER — OFFICE VISIT (OUTPATIENT)
Dept: OTOLARYNGOLOGY | Facility: CLINIC | Age: 24
End: 2024-01-18
Payer: COMMERCIAL

## 2024-01-18 VITALS
HEART RATE: 61 BPM | SYSTOLIC BLOOD PRESSURE: 112 MMHG | WEIGHT: 219 LBS | TEMPERATURE: 98.2 F | DIASTOLIC BLOOD PRESSURE: 72 MMHG | BODY MASS INDEX: 44.23 KG/M2

## 2024-01-18 DIAGNOSIS — G47.33 OSA (OBSTRUCTIVE SLEEP APNEA): Primary | ICD-10-CM

## 2024-01-18 DIAGNOSIS — R06.83 SNORING: ICD-10-CM

## 2024-01-18 DIAGNOSIS — H91.93 BILATERAL HEARING LOSS, UNSPECIFIED HEARING LOSS TYPE: ICD-10-CM

## 2024-01-18 DIAGNOSIS — Q90.9 DOWN SYNDROME: ICD-10-CM

## 2024-01-18 DIAGNOSIS — H61.20 CERUMEN IN AUDITORY CANAL ON EXAMINATION: ICD-10-CM

## 2024-01-18 NOTE — PATIENT INSTRUCTIONS
Nasal steroid use:  Using nasal steroids:  You will be prescribed one of the following nasal steroids: Flonase, Nasacort, Nasonex, Rhinocort, Qnasl, Zetonna  2 puffs each nostril 2 times daily  Start as soon as possible  If you are using Afrin for 3 days with the nasal steroid,  Use Afrin first and wait 10 minutes to allow the nose to open. Then administer nasal steroids.   NASAL SALINE:  Use 2 puffs each nostril 4-6 times daily and more frequently if possible.  You can buy saline spray or you can make your own and use an old spray bottle to administer  Use a humidifier at bedside  Recipe for saline:  Water                                 1 quart  Salt (table)                        1 tablespoon  Gylcerin (or Rissa Syrup)    1 teaspoon  Sodium bicarbonate           1 teaspoon  Sprays or Rogers pots are recommended    Do not allow to stand for more than 24 hrs. Make new solution. There is no preservative in this solution.    Sinus irrigation and saline application can be enhanced with various over-the-counter products.  A WaterPik can be quite useful to irrigate, especially following sinus surgery.  Navage makes a product that irrigates the nose and some of the sinuses.  NeilMed makes a sign you Gator to irrigate the sinuses.  Neomed also has canned saline that we will come out under pressure.  A Lizeth pot can also be helpful.  All of these products help keep the nose clear of debris.  Please use as directed on the instructions that come with the particular device.

## 2024-01-18 NOTE — PROGRESS NOTES
SHERON Colbert  W ENT [facility] EAR NOSE & THROAT  [address], [address]  [facility] [address]  Fax [phone]  Phone [phone]      Visit Type: NEW PATIENT   Chief Complaint   Patient presents with    Sleep Apnea     Wants to talk about inspire procedure    H/O Impacted cerumen        HPI  Accompanied by: Mother  Cynthia Guzman is a 23 y.o. female who presents for evaluation of ent complaints. Wants to discuss inspire procedure. Mother reports she has had sleep apnea for aprox 10 years, last sleep study 5-7 years, was seeing ENT in [facility] for this in the past but has recently moved to the area.   Mother states they have tried Bi-Pap and C-pap but is unable to tolerate. They have discussed Inspire in the past but did not pursue due to BMI.     Grand Cane score today was 2  States she does not seem to have day time drowsiness, main concern when she was diagnosed was due to elevated hemoglobin, concerned for possible complications/griselda conditions associated with sleep apnea.   Mother does report she snores, does have apneic episodes frequently.     Mother does report history of having frequent cerumen build up. Has to have canals cleaned out bilaterally periodically, has had some minor pressure to right ear recently but denies pain drainage or notable hearing change.   Does report history of chronic hearing loss bilaterally.     Past Medical History:   Diagnosis Date    Anxiety     Down syndrome     Insulin resistance     Vasovagal syncope        Past Surgical History:   Procedure Laterality Date    ADENOIDECTOMY      EAR TUBES      TONSILLECTOMY         Family History: Her family history includes Hypertension in her father.     Social History: She  reports that she has never smoked. She does not have any smokeless tobacco history on file. She reports that she does not drink alcohol and does not use drugs.    Home  "Medications:  Cholecalciferol, Semaglutide, Super Probiotic, cetirizine, fluticasone, levothyroxine, metFORMIN ER, and norethindrone    Allergies:  She has No Known Allergies.       Vital Signs:   Temp:  [98.2 °F (36.8 °C)] 98.2 °F (36.8 °C)  Heart Rate:  [61] 61  BP: (112)/(72) 112/72  ENT Physical Exam  Constitutional  Appearance: patient appears well-developed, well-nourished and well-groomed, patient is cooperative;  Communication/Voice: vocal quality normal;  Head and Face  Appearance: head appears normal and face appears atraumatic;  Palpation: facial palpation normal;  Ear  Hearing: intact;  Auricles: bilateral auricles normal;  External Mastoids: bilateral external mastoids normal;  Ear Canals: bilateral ear canals normal;  Tympanic Membranes: bilateral tympanic membranes normal;  Nose  External Nose: nares patent bilaterally; external nose normal;  Oral Cavity/Oropharynx  Lips: normal;  Soft palate: normal;  Tonsils: normal;       Ear Cerumen Removal    Date/Time: 1/18/2024 2:15 PM    Performed by: Nestor Otero APRN  Authorized by: Nestor Otero APRN  Consent: Verbal consent obtained.  Risks and benefits: risks, benefits and alternatives were discussed  Consent given by: parent and guardian  Patient understanding: patient states understanding of the procedure being performed  Patient identity confirmed: verbally with patient (Verbally with guardian/mother)  Time out: Immediately prior to procedure a \"time out\" was called to verify the correct patient, procedure, equipment, support staff and site/side marked as required.    Anesthesia:  Local Anesthetic: none  Location details: right ear and left ear  Patient tolerance: patient tolerated the procedure well with no immediate complications  Comments: Ceruemn removal from ear canals bilaterally performed as described above, successful and well tolerated. Post procedure there is minimal cerumen present, canals relatively normal, Tms appear intact " bilaterally.  Procedure type: instrumentation, curette   Sedation:  Patient sedated: no           Result Review    RESULTS REVIEW    I have reviewed the patients old records in the chart.      Assessment & Plan  ANETA (obstructive sleep apnea)    Snoring    Bilateral hearing loss, unspecified hearing loss type    Cerumen in auditory canal on examination    Down syndrome       Orders Placed This Encounter   Procedures    Ear Cerumen Removal    Comprehensive Hearing Test         Medical and surgical options were discussed including observation, continued medical management, medication modification, and surgical management. Risks, benefits and alternatives were discussed and questions were answered. After considering the options, the patient decided to proceed with observation and continued medical management.    Treatment options discussed for sleep apnea discussed. I reviewed inspire indications with them today. Advised they will need to repeat sleep study, and usually target goal for BMI is below 40. I offered sleep study order and referral to Dr. Flower for possible weight management/nutrition counseling. Mother states she will consider but wants to wait and try home measures first, she is also planning to discuss weight loss options with PCP, she wants to wait to obtain a new sleep study until she works more on weight loss at this time. I also had smaller discussion about EUP3 procedure, does not want to pursue or consider this at this time.     For reported chronic hearing loss we will obtain hearing test at follow up. Will recheck ears and follow for routine ear care and hearing as needed based on this.       Return in about 3 months (around 4/18/2024) for Recheck with audio.    Electronically signed by SHERON Colbert 01/18/24 2:11 PM CST.

## 2024-04-29 ENCOUNTER — OFFICE VISIT (OUTPATIENT)
Dept: OTOLARYNGOLOGY | Facility: CLINIC | Age: 24
End: 2024-04-29
Payer: COMMERCIAL

## 2024-04-29 ENCOUNTER — PROCEDURE VISIT (OUTPATIENT)
Dept: OTOLARYNGOLOGY | Facility: CLINIC | Age: 24
End: 2024-04-29
Payer: COMMERCIAL

## 2024-04-29 VITALS
DIASTOLIC BLOOD PRESSURE: 65 MMHG | HEART RATE: 59 BPM | SYSTOLIC BLOOD PRESSURE: 101 MMHG | BODY MASS INDEX: 41.12 KG/M2 | HEIGHT: 59 IN | TEMPERATURE: 96.2 F | WEIGHT: 204 LBS | RESPIRATION RATE: 20 BRPM

## 2024-04-29 DIAGNOSIS — Q90.9 DOWN SYNDROME: ICD-10-CM

## 2024-04-29 DIAGNOSIS — Z01.10 HEARING WITHIN NORMAL LIMITS IN BOTH EARS: Primary | ICD-10-CM

## 2024-04-29 DIAGNOSIS — G47.33 OSA (OBSTRUCTIVE SLEEP APNEA): ICD-10-CM

## 2024-04-29 DIAGNOSIS — R06.83 SNORING: ICD-10-CM

## 2024-04-29 DIAGNOSIS — H61.20 CERUMEN IN AUDITORY CANAL ON EXAMINATION: ICD-10-CM

## 2024-04-29 PROCEDURE — 92557 COMPREHENSIVE HEARING TEST: CPT

## 2024-04-29 PROCEDURE — 69210 REMOVE IMPACTED EAR WAX UNI: CPT | Performed by: NURSE PRACTITIONER

## 2024-04-29 PROCEDURE — 99213 OFFICE O/P EST LOW 20 MIN: CPT | Performed by: NURSE PRACTITIONER

## 2024-04-29 PROCEDURE — 92567 TYMPANOMETRY: CPT

## 2024-04-29 RX ORDER — BUSPIRONE HYDROCHLORIDE 10 MG/1
TABLET ORAL
COMMUNITY
Start: 2024-04-09

## 2024-04-29 NOTE — PROGRESS NOTES
SHERON Colbert  ELMA [facility]NT [facility] GROUP EAR NOSE & THROAT  [address], [address]  [facility] [address]  Fax [phone]  Phone [phone]      Visit Type: FOLLOW UP   Chief Complaint   Patient presents with    Ear Problem           HPI  Cynthia Guzman is a 24 y.o. female who presents for follow up evaluation.   Mother reports she has had sleep apnea for aprox 10 years, last sleep study 5-7 years, was seeing ENT in [facility] for this in the past but has recently moved to the area.   Mother states they have tried Bi-Pap and C-pap but is unable to tolerate. They have discussed Inspire in the past but did not pursue due to BMI.     States she does not seem to have day time drowsiness, main concern when she was diagnosed was due to elevated hemoglobin, concerned for possible complications/griselda conditions associated with sleep apnea.   Mother does report she snores, does have apneic episodes frequently.      Mother does report history of having frequent cerumen build up. Has to have canals cleaned out bilaterally periodically, has had some minor pressure to right ear recently but denies pain drainage or notable hearing change.   Does report history of chronic hearing loss bilaterally.     Since last visit mother states she seems to be doing well. They have been working on diet and exercise for weight loss, were going to [place] [employer] but have not yet, states she has lost approx 15 pounds since January though.   She denies new symptoms or complaints.   Does believe she has a cerumen buildup again bilaterally. Has not started using oil care.    Past Medical History:   Diagnosis Date    Anxiety     Down syndrome     Insulin resistance     Vasovagal syncope        Past Surgical History:   Procedure Laterality Date    ADENOIDECTOMY      EAR TUBES      TONSILLECTOMY         Family History: Her family history includes Hypertension in her father.     Social  History: She  reports that she has never smoked. She has never been exposed to tobacco smoke. She does not have any smokeless tobacco history on file. She reports that she does not drink alcohol and does not use drugs.    Home Medications:  Cholecalciferol, Semaglutide, Super Probiotic, busPIRone, cetirizine, fluticasone, levothyroxine, metFORMIN ER, and norethindrone    Allergies:  She has No Known Allergies.       Vital Signs:   Temp:  [96.2 °F (35.7 °C)] 96.2 °F (35.7 °C)  Heart Rate:  [59] 59  Resp:  [20] 20  BP: (101)/(65) 101/65  ENT Physical Exam  Constitutional  Appearance: patient appears well-developed, well-nourished and well-groomed, patient is cooperative;  Communication/Voice: vocal quality normal;  Head and Face  Appearance: head appears normal and face appears atraumatic;  Palpation: facial palpation normal;  Ear  Hearing: intact;  Auricles: bilateral auricles normal;  External Mastoids: bilateral external mastoids normal;  Ear Canals: right ear canal impacted cerumen observed; left ear canal impacted cerumen observed; Ear Canal comments: cerumen removed frmo canals bilaterally during exam, post removal appear relatively normal.  Tympanic Membranes: bilateral tympanic membranes normal;  Nose  External Nose: nares patent bilaterally; external nose normal;  Oral Cavity/Oropharynx  Lips: normal;  Soft palate: normal;  Tonsils: normal;  Neck  Neck: large neck circumference present; no neck tenderness present;  Respiratory  Inspection: breathing unlabored; normal breathing rate;  Cardiovascular  Inspection: extremities are warm and well perfused;  Neurovestibular  Mental Status: alert and oriented;       Ear Cerumen Removal    Date/Time: 4/29/2024 3:45 PM    Performed by: Nestor Otero APRN  Authorized by: Nestor Otero APRN  Consent: Verbal consent obtained.  Consent given by: parent  Patient understanding: patient states understanding of the procedure being performed  Patient consent: the patient's  "understanding of the procedure matches consent given  Procedure consent: procedure consent matches procedure scheduled  Patient identity confirmed: verbally with patient  Time out: Immediately prior to procedure a \"time out\" was called to verify the correct patient, procedure, equipment, support staff and site/side marked as required.    Anesthesia:  Local Anesthetic: none  Location details: right ear and left ear  Patient tolerance: patient tolerated the procedure well with no immediate complications  Procedure type: instrumentation, curette   Sedation:  Patient sedated: no           Result Review       RESULTS REVIEW    I have reviewed the patients old records in the chart.   The following results/records were reviewed:     Procedure visit with Raissa Messer AUD (04/29/2024)          Assessment & Plan  Hearing within normal limits in both ears    ANETA (obstructive sleep apnea)    Snoring    Cerumen in auditory canal on examination    Down syndrome       Orders Placed This Encounter   Procedures    Ear Cerumen Removal           Audio reviewed in office with patient and mother. Appears to have grossly intact hearing, some upper frequency loss present bilaterally, relatively symmetrica appearing. tympanometry is type As bilaterally.  bilaterally.    Treatment options discussed, mother wishes to continue with conservative measures and current therapy. She will continue to work on home interventions for weight loss and sleep apnea, we can discuss this again in the future if surgery is reconsidered. We will plan to follow routinely given recurrence of Cerumen impaction. Will trial 6 month interval for now.       Medical and surgical options were discussed including observation, continued medical management, medication modification, and surgical management. Risks, benefits and alternatives were discussed and questions were answered. After considering the options, the patient decided to proceed with continued " medical management.  Call for ear problems, especially change of hearing, ear pain or dizziness.  Protect getting water in the ears. If needed, may use over the counter silicone plugs or a cotton ball coated with vasoline when bathing.  Use hairdryer on a cool setting after bathing.  Use hearing protection in loud noise situations.  Oil care to ears  Continue to work on weight loss, diet and exercise etc for sleep apnea.       Call with questions or concerns    Return in about 6 months (around 10/29/2024) for Recheck, cerumen cleanout.        Electronically signed by SHERON Colbert 04/29/24 4:12 PM CDT.

## 2024-04-29 NOTE — PROGRESS NOTES
AUDIOMETRIC EVALUATION      Name:  Cynthia Guzman  :  2000  Age:  24 y.o.  Date of Evaluation:  2024       History:  Ms. Guzman is seen today for a hearing evaluation due to cerumen impaction and sleep apnea at the request of SHERON Hayes.    Audiologic Information:  Concerns for Hearing: No  PETs: Bilateral history  Other otologic surgical history: No  Aural Pressure/Fullness: No  Otalgia: No  Otorrhea: No  Tinnitus: No  Dizziness: No  Noise Exposure: No  Family history of hearing loss: No  Head trauma requiring hospital stay: No  Chemotherapy: No  Other significant history: down syndrome, sleep apnea (unable to tolerate CPap or BiPap)    **Case history obtained in office and through EMR system      EVALUATION:        RESULTS:    Otoscopic Evaluation:  Right: minimal cerumen, tympanic membrane visualized  Left: minimal cerumen, tympanic membrane visualized    Tympanometry (226 Hz):  Right: Type As  Left: Type As    Pure Tone Audiometry:    Right: Normal thresholds (250Hz-4000Hz) sloping to moderate high frequency difficulty  Left: Normal thresholds (250Hz-6000Hz) sloping to moderate high frequency difficulty    Speech Audiometry:   Right: Speech Reception Threshold (SRT) was obtained at 20 dB HL  Word Recognition scores - Excellent (100)% at 60 dB, using NU-6 List 3A with 10 words  Left: Speech Reception Threshold (SRT) was obtained at 20 dB HL  Word Recognition scores - Excellent (100)% at 60 dB, using NU-6 List 3A with 10 words  SRT/PTA in good agreement bilaterally.    IMPRESSIONS:  Tympanometry showed normal middle ear pressure with reduced static compliance, consistent with hypomobile tympanic membrane, for both ears. Pure tone thresholds for both ears show grossly normal hearing, suggesting normal outer/middle ear function and normal cochlear/retrocochlear function. Patient was counseled with regard to the findings.      Diagnosis:  1. Hearing within normal limits in both ears          RECOMMENDATIONS/PLAN:  Follow-up recommendations per SHERON Hayes.  Practice good communication strategies to assist with everyday listening (eye contact with speakers, reduce background noise, encourage others to communicate clearly and slowly).  Consistent utilization of hearing protection devices in noisy environments.  Repeat hearing evaluation if changes in hearing are noted or concerns arise.  Discussed results and recommendations with patient. Questions were addressed and the patient was encouraged to contact our department should concerns arise.        Raissa Camarillo, CCC-A, F-AAA  Doctor of Audiology

## 2024-10-28 ENCOUNTER — OFFICE VISIT (OUTPATIENT)
Dept: OTOLARYNGOLOGY | Facility: CLINIC | Age: 24
End: 2024-10-28
Payer: COMMERCIAL

## 2024-10-28 VITALS
TEMPERATURE: 98.6 F | HEIGHT: 59 IN | SYSTOLIC BLOOD PRESSURE: 125 MMHG | DIASTOLIC BLOOD PRESSURE: 67 MMHG | HEART RATE: 61 BPM | BODY MASS INDEX: 37.42 KG/M2 | WEIGHT: 185.6 LBS

## 2024-10-28 DIAGNOSIS — Q90.9 DOWN SYNDROME: ICD-10-CM

## 2024-10-28 DIAGNOSIS — R06.83 SNORING: ICD-10-CM

## 2024-10-28 DIAGNOSIS — H61.20 CERUMEN IN AUDITORY CANAL ON EXAMINATION: ICD-10-CM

## 2024-10-28 DIAGNOSIS — G47.33 OSA (OBSTRUCTIVE SLEEP APNEA): ICD-10-CM

## 2024-10-28 DIAGNOSIS — H61.23 BILATERAL IMPACTED CERUMEN: Primary | ICD-10-CM

## 2024-10-28 RX ORDER — LEVOTHYROXINE SODIUM 125 UG/1
125 TABLET ORAL DAILY
COMMUNITY
Start: 2024-10-14

## 2024-10-28 RX ORDER — CIPROFLOXACIN AND DEXAMETHASONE 3; 1 MG/ML; MG/ML
4 SUSPENSION/ DROPS AURICULAR (OTIC) 2 TIMES DAILY
Qty: 7.5 ML | Refills: 0 | Status: SHIPPED | OUTPATIENT
Start: 2024-10-28

## 2024-10-28 NOTE — PROGRESS NOTES
SHERON Colbert  [facility] ENT [facility] EAR NOSE & THROAT  [address]  [facility] [address]  Fax [phone]  Phone [phone]      Visit Type: FOLLOW UP   Chief Complaint   Patient presents with    Ear Problem     6 mo Recheck, cerumen cleanout           HISTORY OBTAINED BY: patient and patient's mother  JOHN  Cynthia Guzman is a 24 y.o. female who presents for follow-up evaluation, recheck ears and possible cerumen cleanout.  She has history of recurrent cerumen impactions, has to be seen about every 6 months for cleanout.  They deny any new notable complaints today.  Deny ear pain, pressure, hearing changes or drainage.    Past Medical History:   Diagnosis Date    Allergic rhinitis     Anxiety     Disease of thyroid gland     Down syndrome     Insulin resistance     Sleep apnea     Vasovagal syncope        Past Surgical History:   Procedure Laterality Date    ADENOIDECTOMY      EAR TUBES      TONSILLECTOMY         Family History: Her family history includes Cancer in her maternal grandfather and maternal grandmother; Hypertension in her father; Osteoarthritis in her mother.     Social History: She  reports that she has never smoked. She has never been exposed to tobacco smoke. She does not have any smokeless tobacco history on file. She reports that she does not drink alcohol and does not use drugs.    Home Medications:  Cholecalciferol, Super Probiotic, busPIRone, cetirizine, ciprofloxacin-dexAMETHasone, fluticasone, levothyroxine, metFORMIN ER, and norethindrone    Allergies:  She has No Known Allergies.       Vital Signs:   Temp:  [98.6 °F (37 °C)] 98.6 °F (37 °C)  Heart Rate:  [61] 61  BP: (125)/(67) 125/67  ENT Physical Exam  Constitutional  Appearance: patient appears well-developed, well-nourished and well-groomed, patient is cooperative;  Communication/Voice: vocal quality normal;  Head and Face  Appearance: head appears normal and  "face appears atraumatic;  Palpation: facial palpation normal;  Ear  Hearing: intact;  Auricles: bilateral auricles normal;  External Mastoids: bilateral external mastoids normal;  Ear Canals: right ear canal impacted cerumen and drainage (mild, thin) observed; left ear canal impacted cerumen observed; Ear Canal comments: cerumen removed frmo canals bilaterally during exam, post removal appear relatively normal.  Tympanic Membranes: bilateral tympanic membranes normal;  Nose  External Nose: nares patent bilaterally; external nose normal;  Oral Cavity/Oropharynx  Lips: normal;  Soft palate: normal;  Tonsils: normal;  Neck  Neck: large neck circumference present; no neck tenderness present;  Respiratory  Inspection: breathing unlabored; normal breathing rate;  Cardiovascular  Inspection: extremities are warm and well perfused;  Neurovestibular  Mental Status: alert and oriented;       Ear Cerumen Removal    Date/Time: 10/28/2024 10:48 AM    Performed by: Nestor Otero APRN  Authorized by: Nestor Otero APRN  Consent: Verbal consent obtained.  Risks and benefits: risks, benefits and alternatives were discussed  Consent given by: patient and parent  Patient understanding: patient states understanding of the procedure being performed  Imaging studies: imaging studies available  Patient identity confirmed: verbally with patient  Time out: Immediately prior to procedure a \"time out\" was called to verify the correct patient, procedure, equipment, support staff and site/side marked as required.    Anesthesia:  Local Anesthetic: none  Location details: right ear and left ear  Patient tolerance: patient tolerated the procedure well with no immediate complications  Procedure type: instrumentation, curette   Sedation:  Patient sedated: no           Result Review       RESULTS REVIEW    I have reviewed the patients old records in the chart.           Assessment & Plan  Cerumen in auditory canal on examination    Down " syndrome    Snoring    ANETA (obstructive sleep apnea)    Bilateral impacted cerumen       Orders Placed This Encounter   Procedures    Ear Cerumen Removal      New Medications Ordered This Visit   Medications    ciprofloxacin-dexAMETHasone (CIPRODEX) 0.3-0.1 % otic suspension     Sig: Administer 4 drops into ear(s) as directed by provider 2 (Two) Times a Day.     Dispense:  7.5 mL     Refill:  0     Cerumen cleanout performed in office, see procedure note.  Cerumen removed successfully bilaterally.  Was some very mild drainage present to the right ear, no significant inflammation of the TM is intact and relatively normal-looking, looks more like this is actually from moisture retained in the ear stopping the wax but  she about to go on vacation and traveling so I will go ahead and send her in some Ciprodex to use for the next week just in case.  Advised mother to call us if she actually starts noticing drainage from the ear, pain or any other associated symptoms.  Otherwise we will plan to continue with routine evaluations about every 6 months.    Water precautions  Ciprodex x 7 days    Call with questions or concerns  Return in about 6 months (around 4/28/2025) for Recheck ears, cerumen removal.        Electronically signed by SHERON Colbert 10/28/24 1:16 PM CDT.

## 2024-12-16 ENCOUNTER — OFFICE VISIT (OUTPATIENT)
Dept: OBSTETRICS AND GYNECOLOGY | Age: 24
End: 2024-12-16
Payer: COMMERCIAL

## 2024-12-16 VITALS
SYSTOLIC BLOOD PRESSURE: 116 MMHG | BODY MASS INDEX: 37.9 KG/M2 | HEIGHT: 59 IN | DIASTOLIC BLOOD PRESSURE: 76 MMHG | WEIGHT: 188 LBS

## 2024-12-16 DIAGNOSIS — N94.89 MENSTRUAL SUPPRESSION: ICD-10-CM

## 2024-12-16 PROCEDURE — 99213 OFFICE O/P EST LOW 20 MIN: CPT | Performed by: NURSE PRACTITIONER

## 2024-12-16 RX ORDER — ACETAMINOPHEN AND CODEINE PHOSPHATE 120; 12 MG/5ML; MG/5ML
SOLUTION ORAL
Qty: 168 TABLET | Refills: 4 | Status: SHIPPED | OUTPATIENT
Start: 2024-12-16

## 2024-12-16 NOTE — PROGRESS NOTES
Chief Complaint   Patient presents with    menstrual suppression      Pt here today with mom to follow up on OCP. Pt doing well with current pill. Pt voices no concerns.        History:  Cynthia Guzman is a 24 y.o. female who presents today for follow-up for evaluation of the above:  Pt comes in today for refills on birth control. Continues to suppress periods with just occasional break through bleeding.         ROS:  Review of Systems   Constitutional:  Negative for activity change and unexpected weight loss.   HENT:  Negative for congestion.    Cardiovascular:  Negative for chest pain.   Gastrointestinal:  Negative for blood in stool, constipation and diarrhea.   Endocrine: Negative for cold intolerance and heat intolerance.   Genitourinary:  Negative for dyspareunia, pelvic pain and vaginal discharge.   Musculoskeletal:  Negative for arthralgias, back pain, neck pain and neck stiffness.   Skin:  Negative for rash.   Neurological:  Negative for dizziness and headache.   Psychiatric/Behavioral:  Negative for sleep disturbance. The patient is not nervous/anxious.        Ms. Guzman  reports that she has never smoked. She has never been exposed to tobacco smoke. She does not have any smokeless tobacco history on file. She reports that she does not drink alcohol and does not use drugs.      Current Outpatient Medications:     busPIRone (BUSPAR) 10 MG tablet, , Disp: , Rfl:     cetirizine (zyrTEC) 10 MG tablet, , Disp: , Rfl:     Cholecalciferol 50 MCG (2000 UT) capsule, Take  by mouth., Disp: , Rfl:     fluticasone (FLONASE) 50 MCG/ACT nasal spray, 1 SPRAY DAILY, Disp: , Rfl:     levothyroxine (SYNTHROID, LEVOTHROID) 125 MCG tablet, Take 1 tablet by mouth Daily., Disp: , Rfl:     metFORMIN ER (GLUCOPHAGE-XR) 500 MG 24 hr tablet, 1 tablet Daily., Disp: , Rfl:     norethindrone (MICRONOR) 0.35 MG tablet, Take 2 tablets daily., Disp: 168 tablet, Rfl: 4    Probiotic Product (Super Probiotic) capsule, Take 1 capsule by  "mouth., Disp: , Rfl:       OBJECTIVE:  /76   Ht 149.9 cm (59\")   Wt 85.3 kg (188 lb)   BMI 37.97 kg/m²    Physical Exam  Vitals and nursing note reviewed.   Constitutional:       Appearance: She is well-developed.   HENT:      Head: Normocephalic and atraumatic.   Eyes:      General:         Right eye: No discharge.         Left eye: No discharge.      Conjunctiva/sclera: Conjunctivae normal.   Neck:      Thyroid: No thyromegaly.   Cardiovascular:      Rate and Rhythm: Normal rate and regular rhythm.      Heart sounds: Normal heart sounds. No murmur heard.  Pulmonary:      Effort: Pulmonary effort is normal.      Breath sounds: Normal breath sounds.   Musculoskeletal:      Cervical back: Normal range of motion and neck supple.   Skin:     General: Skin is warm and dry.   Neurological:      Mental Status: She is alert and oriented to person, place, and time.   Psychiatric:         Mood and Affect: Mood normal.         Behavior: Behavior normal.         Thought Content: Thought content normal.         Judgment: Judgment normal.         Assessment/Plan    Diagnoses and all orders for this visit:    1. Menstrual suppression  -     norethindrone (MICRONOR) 0.35 MG tablet; Take 2 tablets daily.  Dispense: 168 tablet; Refill: 4    Continues to decline pap and physical exam. Not sexually active.       An After Visit Summary was printed and given to the patient at discharge.  Return in about 1 year (around 12/16/2025) for Recheck. Sooner if problems arise.          SHERON Mckeon  Electronically Signed  "

## 2025-01-07 DIAGNOSIS — N94.89 MENSTRUAL SUPPRESSION: ICD-10-CM

## 2025-01-07 RX ORDER — ACETAMINOPHEN AND CODEINE PHOSPHATE 120; 12 MG/5ML; MG/5ML
SOLUTION ORAL
Qty: 168 TABLET | Refills: 4 | OUTPATIENT
Start: 2025-01-07

## 2025-03-27 ENCOUNTER — OFFICE VISIT (OUTPATIENT)
Dept: OBSTETRICS AND GYNECOLOGY | Age: 25
End: 2025-03-27
Payer: COMMERCIAL

## 2025-03-27 VITALS
BODY MASS INDEX: 37.9 KG/M2 | SYSTOLIC BLOOD PRESSURE: 116 MMHG | DIASTOLIC BLOOD PRESSURE: 74 MMHG | HEIGHT: 59 IN | WEIGHT: 188 LBS

## 2025-03-27 DIAGNOSIS — Q90.9 DOWN SYNDROME: ICD-10-CM

## 2025-03-27 DIAGNOSIS — N93.8 DUB (DYSFUNCTIONAL UTERINE BLEEDING): Primary | ICD-10-CM

## 2025-03-27 PROCEDURE — 99214 OFFICE O/P EST MOD 30 MIN: CPT | Performed by: NURSE PRACTITIONER

## 2025-03-27 NOTE — PROGRESS NOTES
Chief Complaint   Patient presents with    Menstrual Problem     Patient here for period issue. Patient reports no period for 10 years and has started having periods in March with a lot of breakthrough bleeding.        History:  Cynthia Guzman is a 25 y.o. female who presents today for follow-up for evaluation of the above:  Pt comes in today to follow up on birth control. Has been on current birth control dose for 10 years with no bleeding. Has now had increased breakthrough bleeding.           ROS:  Review of Systems   Constitutional:  Negative for activity change and unexpected weight loss.   HENT:  Negative for congestion.    Cardiovascular:  Negative for chest pain.   Gastrointestinal:  Negative for blood in stool, constipation and diarrhea.   Endocrine: Negative for cold intolerance and heat intolerance.   Genitourinary:  Positive for menstrual problem. Negative for dyspareunia, pelvic pain and vaginal discharge.   Musculoskeletal:  Negative for arthralgias, back pain, neck pain and neck stiffness.   Skin:  Negative for rash.   Neurological:  Negative for dizziness and headache.   Psychiatric/Behavioral:  Negative for sleep disturbance. The patient is not nervous/anxious.        Ms. Guzman  reports that she has never smoked. She has never been exposed to tobacco smoke. She does not have any smokeless tobacco history on file. She reports that she does not drink alcohol and does not use drugs.      Current Outpatient Medications:     cetirizine (zyrTEC) 10 MG tablet, , Disp: , Rfl:     Cholecalciferol 50 MCG (2000 UT) capsule, Take  by mouth., Disp: , Rfl:     fluticasone (FLONASE) 50 MCG/ACT nasal spray, 1 SPRAY DAILY, Disp: , Rfl:     levothyroxine (SYNTHROID, LEVOTHROID) 125 MCG tablet, Take 1 tablet by mouth Daily., Disp: , Rfl:     metFORMIN ER (GLUCOPHAGE-XR) 500 MG 24 hr tablet, 1 tablet Daily., Disp: , Rfl:     norethindrone (MICRONOR) 0.35 MG tablet, Take 2 tablets daily., Disp: 168 tablet, Rfl: 4    " Probiotic Product (Super Probiotic) capsule, Take 1 capsule by mouth., Disp: , Rfl:       OBJECTIVE:  /74 (BP Location: Left arm, Patient Position: Sitting, Cuff Size: Adult)   Ht 149.9 cm (59\")   Wt 85.3 kg (188 lb)   LMP 03/01/2025   BMI 37.97 kg/m²    Physical Exam  Vitals and nursing note reviewed.   Constitutional:       Appearance: She is well-developed.   HENT:      Head: Normocephalic and atraumatic.   Eyes:      General:         Right eye: No discharge.         Left eye: No discharge.      Conjunctiva/sclera: Conjunctivae normal.   Neck:      Thyroid: No thyromegaly.   Cardiovascular:      Rate and Rhythm: Normal rate and regular rhythm.      Heart sounds: Normal heart sounds. No murmur heard.  Pulmonary:      Effort: Pulmonary effort is normal.      Breath sounds: Normal breath sounds.   Musculoskeletal:      Cervical back: Normal range of motion and neck supple.   Skin:     General: Skin is warm and dry.   Neurological:      Mental Status: She is alert and oriented to person, place, and time.   Psychiatric:         Mood and Affect: Mood normal.         Behavior: Behavior normal.         Thought Content: Thought content normal.         Judgment: Judgment normal.         Assessment/Plan    Diagnoses and all orders for this visit:    1. DUB (dysfunctional uterine bleeding) (Primary)    2. Down syndrome    Pt comes in today with complaints of DUB. Pt isn't able to take care of herself well, so hoping they can do something to prevent bleeding at all again.     Discussed case with Dr. Griggs. He recommends trialing slynd.     Pt caregiver does mention that she has labs coming up for TSH. Will first have those checked with that provider. If level is normal then caregiver will let us know and we will send in Slynd. If TSH is abnormal then will plan to continue same until it is regulated to see if it regulates bleeding again.          An After Visit Summary was printed and given to the patient at " discharge.  Return in about 3 months (around 6/27/2025) for Recheck. Sooner if problems arise.          SHERON Mckeon  Electronically Signed

## 2025-04-04 DIAGNOSIS — Q90.9 DOWN SYNDROME: ICD-10-CM

## 2025-04-04 DIAGNOSIS — N93.8 DUB (DYSFUNCTIONAL UTERINE BLEEDING): Primary | ICD-10-CM

## 2025-04-04 DIAGNOSIS — N94.89 MENSTRUAL SUPPRESSION: ICD-10-CM

## 2025-04-04 RX ORDER — DROSPIRENONE 4 MG/1
1 TABLET, FILM COATED ORAL DAILY
Qty: 28 TABLET | Refills: 2 | Status: SHIPPED | OUTPATIENT
Start: 2025-04-04 | End: 2025-04-07 | Stop reason: SDUPTHER

## 2025-04-07 DIAGNOSIS — N93.8 DUB (DYSFUNCTIONAL UTERINE BLEEDING): ICD-10-CM

## 2025-04-07 DIAGNOSIS — Q90.9 DOWN SYNDROME: ICD-10-CM

## 2025-04-07 DIAGNOSIS — N94.89 MENSTRUAL SUPPRESSION: ICD-10-CM

## 2025-04-07 RX ORDER — DROSPIRENONE 4 MG/1
1 TABLET, FILM COATED ORAL DAILY
Qty: 28 TABLET | Refills: 2 | Status: SHIPPED | OUTPATIENT
Start: 2025-04-07

## 2025-04-28 ENCOUNTER — OFFICE VISIT (OUTPATIENT)
Dept: OTOLARYNGOLOGY | Facility: CLINIC | Age: 25
End: 2025-04-28
Payer: COMMERCIAL

## 2025-04-28 VITALS
DIASTOLIC BLOOD PRESSURE: 68 MMHG | HEART RATE: 66 BPM | WEIGHT: 191.4 LBS | HEIGHT: 59 IN | SYSTOLIC BLOOD PRESSURE: 137 MMHG | TEMPERATURE: 98.2 F | BODY MASS INDEX: 38.59 KG/M2

## 2025-04-28 DIAGNOSIS — R06.83 SNORING: ICD-10-CM

## 2025-04-28 DIAGNOSIS — H61.23 BILATERAL IMPACTED CERUMEN: ICD-10-CM

## 2025-04-28 DIAGNOSIS — H61.20 CERUMEN IN AUDITORY CANAL ON EXAMINATION: Primary | ICD-10-CM

## 2025-04-28 DIAGNOSIS — Q90.9 DOWN SYNDROME: ICD-10-CM

## 2025-04-28 PROCEDURE — 69210 REMOVE IMPACTED EAR WAX UNI: CPT | Performed by: NURSE PRACTITIONER

## 2025-04-28 NOTE — PATIENT INSTRUCTIONS
EAR CARE: :using oil  Use a hair dryer on low heat and blow into the ear canals 2 times daily to keep ears dry.  DO Not use Q tips or Lorrie pins, ever!!  Do not use alcohol in the ear canal (this will cause dryness and itching)  NO peroxide or alcohol in ears  Oil: Use Sweet oil, Olive oil, or Mineral oil, purchased over the counter, once a week, Do not use Q tips, You may use a hair dryer on low heat. Blow in ears for 10-15 seconds 2 times daily to dry ear canals or if ear canals are itching.

## 2025-04-28 NOTE — PROGRESS NOTES
SHERON Colbert  [facility] EAR NOSE & THROAT  [address], [address]  [place] [address]  Fax [phone]  Phone [phone]      Visit Type: FOLLOW UP   Chief Complaint   Patient presents with    Ear Problem     Bilateral impacted cerumen: Pt says that the ears are doing very well and isn't having any problems.           HISTORY OBTAINED FROM: patient and patient's father  HPI  Cynthia Guzman is a 25 y.o. female who presents for follow-up evaluation, recheck of ear complaints, recurrent cerumen impaction.She has history of recurrent cerumen impactions, has to be seen about every 6 months for cleanout.  She denies any new notable complaints today. Deny ear pain, pressure, hearing changes or drainage.     Past Medical History:   Diagnosis Date    Allergic rhinitis     Anxiety     Disease of thyroid gland     Down syndrome     Insulin resistance     Sleep apnea     Vasovagal syncope        Past Surgical History:   Procedure Laterality Date    ADENOIDECTOMY      EAR TUBES      TONSILLECTOMY         Family History: Her family history includes Cancer in her maternal grandfather and maternal grandmother; Hypertension in her father; Osteoarthritis in her mother.     Social History: She  reports that she has never smoked. She has never been exposed to tobacco smoke. She has never used smokeless tobacco. She reports that she does not drink alcohol and does not use drugs.    Home Medications:  Cholecalciferol, Drospirenone, Super Probiotic, cetirizine, fluticasone, levothyroxine, and metFORMIN ER    Allergies:  She has no known allergies.       Vital Signs:   Temp:  [98.2 °F (36.8 °C)] 98.2 °F (36.8 °C)  Heart Rate:  [66] 66  BP: (137)/(68) 137/68  ENT Physical Exam  Constitutional  Appearance: patient appears well-developed, well-nourished and well-groomed, patient is cooperative;  Communication/Voice: vocal quality normal;  Head and Face  Appearance:  head appears normal and face appears atraumatic;  Palpation: facial palpation normal;  Ear  Hearing: intact;  Auricles: bilateral auricles normal;  External Mastoids: bilateral external mastoids normal;  Ear Canals: right ear canal impacted cerumen and drainage (mild, thin) observed; left ear canal impacted cerumen observed; Ear Canal comments: cerumen removed frmo canals bilaterally during exam, post removal appear relatively normal.  Tympanic Membranes: bilateral tympanic membranes normal;  Nose  External Nose: nares patent bilaterally; external nose normal;  Oral Cavity/Oropharynx  Lips: normal;  Soft palate: normal;  Tonsils: normal;  Neck  Neck: large neck circumference present; no neck tenderness present;  Respiratory  Inspection: breathing unlabored; normal breathing rate;  Cardiovascular  Inspection: extremities are warm and well perfused;  Neurovestibular  Mental Status: alert and oriented;       Ear Microscopy with Bilateral Cerumen Removal    Date/Time: 4/28/2025 2:02 PM    Performed by: Nestor Otero APRN  Authorized by: Nestor Otero APRN    Ear microscopy was indicated due to Cerumen impaction.     Consent was given by the patient. The risks of the procedure were discussed including but not limited to bleeding, nerve damage, hearing loss/ tinnitus, tympanic membrane perforation, infection, otorrhea, vertigo, pain, scarring and aural fullness. Alternatives were discussed, including no treatment, delayed treatment, observation and alternative treatments. After discussion of the risks and benefits, questions were allowed and answered until understanding was demonstrated. Consent to perform the procedure was obtained through verbal consent consent.     Ear examination was performed utilizing binocular microscopy.  Right auricle:   normal:   Right ear canal:   impacted cerumen.   Right tympanic membrane:   normal:   Left auricle:   normal:   Left ear canal:   impacted cerumen.   Left tympanic membrane:    normal:     Procedure:    Cerumen was removed from the bilateral ears with a suction.   Post-procedure details:     Inspection after the procedure revealed an intact TM.    No medication was applied to the ear canal.    The procedure was tolerated well, no immediate complications.  Comments:      Bilateral cerumen removal attempted, mostly successful bilaterally, mild residual remaining to right ear canal, left removed successfully.  TM is visible bilaterally and appears normal and intact.     Result Review       RESULTS REVIEW    I have reviewed the patients old records in the chart.             Assessment & Plan  Cerumen in auditory canal on examination    Down syndrome    Snoring    Bilateral impacted cerumen         Call for ear problems, especially change of hearing, ear pain or dizziness.  For the best results, use nasal sprays every day. It may take a week to build up in the nasal lining and a few more weeks to improve the eustachian tube function.  Protect getting water in the ears. If needed, may use over the counter silicone plugs or a cotton ball coated with vasoline when bathing.  Use hairdryer on a cool setting after bathing.  Use hearing protection in loud noise situations.  Oil care 1 -2 times per week    Call with questions or concerns    Return in about 3 months (around 7/28/2025) for Recheck cerumen.        Electronically signed by SHERON Colbert 04/28/25 2:01 PM CDT.

## 2025-05-09 DIAGNOSIS — N94.89 MENSTRUAL SUPPRESSION: ICD-10-CM

## 2025-05-09 DIAGNOSIS — Q90.9 DOWN SYNDROME: ICD-10-CM

## 2025-05-09 DIAGNOSIS — N93.8 DUB (DYSFUNCTIONAL UTERINE BLEEDING): Primary | ICD-10-CM

## 2025-05-09 RX ORDER — NORETHINDRONE ACETATE AND ETHINYL ESTRADIOL 1.5-30(21)
KIT ORAL
Qty: 28 TABLET | Refills: 4 | Status: SHIPPED | OUTPATIENT
Start: 2025-05-09

## 2025-05-27 ENCOUNTER — TELEPHONE (OUTPATIENT)
Dept: OTOLARYNGOLOGY | Facility: CLINIC | Age: 25
End: 2025-05-27
Payer: COMMERCIAL

## 2025-05-27 ENCOUNTER — OFFICE VISIT (OUTPATIENT)
Dept: OTOLARYNGOLOGY | Facility: CLINIC | Age: 25
End: 2025-05-27
Payer: COMMERCIAL

## 2025-05-27 VITALS
DIASTOLIC BLOOD PRESSURE: 61 MMHG | HEART RATE: 64 BPM | TEMPERATURE: 97.9 F | WEIGHT: 196.2 LBS | SYSTOLIC BLOOD PRESSURE: 128 MMHG | BODY MASS INDEX: 39.55 KG/M2 | HEIGHT: 59 IN

## 2025-05-27 DIAGNOSIS — Q90.9 DOWN SYNDROME: ICD-10-CM

## 2025-05-27 DIAGNOSIS — H61.21 IMPACTED CERUMEN, RIGHT EAR: Primary | ICD-10-CM

## 2025-05-27 NOTE — PROGRESS NOTES
SHERON Colbert  W ENT [facility] EAR NOSE & THROAT  [address], [address]  [facility] [address]  Fax [phone]  Phone [phone]      Visit Type: FOLLOW UP   Chief Complaint   Patient presents with    Recheck ear     Clogged up and decreased hearing           HISTORY OBTAINED FROM: patient and patient's mother  JOHN Guzman is a 25 y.o. female who presents for follow-up evaluation.  She reports 2-3 days ago started having some clogged up feeling and decreased hearing again in right ear.  Denies notable complaints to left ear.    Past Medical History:   Diagnosis Date    Allergic rhinitis     Anxiety     Disease of thyroid gland     Down syndrome     Insulin resistance     Sleep apnea     Vasovagal syncope        Past Surgical History:   Procedure Laterality Date    ADENOIDECTOMY      EAR TUBES      TONSILLECTOMY         Family History: Her family history includes Cancer in her maternal grandfather and maternal grandmother; Hypertension in her father; Osteoarthritis in her mother.     Social History: She  reports that she has never smoked. She has never been exposed to tobacco smoke. She has never used smokeless tobacco. She reports that she does not drink alcohol and does not use drugs.    Home Medications:  Cholecalciferol, Super Probiotic, cetirizine, fluticasone, levothyroxine, metFORMIN ER, and norethindrone-ethinyl estradiol-iron    Allergies:  She has no known allergies.       Vital Signs:   Temp:  [97.9 °F (36.6 °C)] 97.9 °F (36.6 °C)  Heart Rate:  [64] 64  BP: (128)/(61) 128/61  ENT Physical Exam  Constitutional  Appearance: patient appears well-developed, well-nourished and well-groomed, patient is cooperative;  Communication/Voice: vocal quality normal;  Head and Face  Appearance: head appears normal and face appears atraumatic; abnormal facies (downs features) present;  Palpation: facial palpation normal;  Ear  Hearing:  intact;  Auricles: bilateral auricles normal;  External Mastoids: bilateral external mastoids normal;  Ear Canals: right ear canal impacted cerumen observed; left ear canal normal; Ear Canal comments: cerumen removed from right ear canal today, postremoval canal relatively normal  Tympanic Membranes: bilateral tympanic membranes normal;  Nose  External Nose: nares patent bilaterally; external nose normal;  Oral Cavity/Oropharynx  Lips: normal;  Teeth: normal;  Gums: gingiva normal;  Tongue: normal;  Oral mucosa: normal;  Hard palate: normal;  Soft palate: normal;  Tonsils: normal;  Base of Tongue: normal;  Posterior pharyngeal wall: normal;  Neck  Neck: large neck circumference present; no neck tenderness present;  Respiratory  Inspection: breathing unlabored; normal breathing rate;  Cardiovascular  Inspection: extremities are warm and well perfused;  Neurovestibular  Mental Status: alert and oriented;       Ear Microscopy with Right Cerumen Removal    Date/Time: 5/27/2025 3:15 PM    Performed by: Nestor Otero APRN  Authorized by: Nestor Otero APRN    Ear microscopy was indicated due to hearing problem and Cerumen impaction.     Consent was given by the parent and patient. The risks of the procedure were discussed including but not limited to nerve damage, hearing loss/ tinnitus, tympanic membrane perforation, bleeding, infection, scarring, aural fullness, vertigo and otorrhea. Alternatives were discussed, including no treatment, delayed treatment, observation and alternative treatments. After discussion of the risks and benefits, questions were allowed and answered until understanding was demonstrated. Consent to perform the procedure was obtained through verbal consent consent.     Ear examination was performed utilizing binocular microscopy.  Right auricle:   normal:   Right ear canal:   impacted cerumen.   Right tympanic membrane:   normal:   Left auricle:   normal:   Left ear canal:   Mild cerumen  buildup, nonobstructing.   Left tympanic membrane:   normal:     Procedure:    Cerumen was removed from the right ear with a suction.   Post-procedure details:     Inspection after the procedure revealed an intact TM.    ACHS powder was applied to the ear canal.    The procedure was tolerated well, no immediate complications.     Result Review       RESULTS REVIEW    I have reviewed the patients old records in the chart.             Assessment & Plan  Impacted cerumen, right ear    Down syndrome       Cerumen faction removed from right ear canal, see procedure note.  Following removal canal appears relatively normal.  She does report she feels this improved hearing and helped fullness in ear.  I will continue conservative measures for now.  Continue basic water precautions  Oil care care about once a week to ears bilaterally    Call with questions or concerns    Return for Recheck as scheduled.        Electronically signed by SHERON Colbert 05/27/25 3:18 PM CDT.

## 2025-05-27 NOTE — TELEPHONE ENCOUNTER
Pt Mom called, stated her right ear is clogged and has decreased hearing in that ear. She states patient says it is driving her crazy. [facility] marco today with Nestor at 2:30. She VU

## 2025-07-07 ENCOUNTER — OFFICE VISIT (OUTPATIENT)
Dept: OTOLARYNGOLOGY | Facility: CLINIC | Age: 25
End: 2025-07-07
Payer: COMMERCIAL

## 2025-07-07 VITALS
TEMPERATURE: 98.4 F | HEIGHT: 59 IN | HEART RATE: 61 BPM | SYSTOLIC BLOOD PRESSURE: 121 MMHG | BODY MASS INDEX: 39.61 KG/M2 | DIASTOLIC BLOOD PRESSURE: 69 MMHG

## 2025-07-07 DIAGNOSIS — H61.20 CERUMEN IN AUDITORY CANAL ON EXAMINATION: Primary | ICD-10-CM

## 2025-07-07 DIAGNOSIS — Q90.9 DOWN SYNDROME: ICD-10-CM

## 2025-07-07 PROCEDURE — 99213 OFFICE O/P EST LOW 20 MIN: CPT | Performed by: NURSE PRACTITIONER

## 2025-07-07 RX ORDER — NORETHINDRONE ACETATE AND ETHINYL ESTRADIOL 1MG-20(21)
1 KIT ORAL DAILY
COMMUNITY

## 2025-07-07 NOTE — PROGRESS NOTES
SHERON Colbert  [facility] ENT [facility] EAR NOSE & THROAT  [address], [address]  [facility] [address]  Fax [phone]  Phone [phone]      Visit Type: FOLLOW UP   Chief Complaint   Patient presents with    Ear Problem           HISTORY OBTAINED FROM: patient and patient's mother  JOHN Guzman is a 25 y.o. female who presents for evaluation, recheck of ears.  She reports she no new symptoms [place] since last visit.  Feels her hearing is normal today.  No significant clogging repression sensation.  They have been using oil care 1-2 times per week since last visit.    Past Medical History:   Diagnosis Date    Allergic rhinitis     Anxiety     Disease of thyroid gland     Down syndrome     Insulin resistance     Sleep apnea     Vasovagal syncope        Past Surgical History:   Procedure Laterality Date    ADENOIDECTOMY      EAR TUBES      TONSILLECTOMY         Family History: Her family history includes Cancer in her maternal grandfather and maternal grandmother; Hypertension in her father; Osteoarthritis in her mother.     Social History: She  reports that she has never smoked. She has never been exposed to tobacco smoke. She has never used smokeless tobacco. She reports that she does not drink alcohol and does not use drugs.    Home Medications:  Cholecalciferol, Super Probiotic, cetirizine, fluticasone, levothyroxine, metFORMIN ER, and norethindrone-ethinyl estradiol FE    Allergies:  She has no known allergies.       Vital Signs:   Temp:  [98.4 °F (36.9 °C)] 98.4 °F (36.9 °C)  Heart Rate:  [61] 61  BP: (121)/(69) 121/69  ENT Physical Exam  Constitutional  Appearance: patient appears well-developed, well-nourished and well-groomed, patient is cooperative;  Communication/Voice: communication appropriate for developmental age; vocal quality normal;  Head and Face  Appearance: head appears normal and face appears atraumatic; abnormal facies (downs  features) present;  Palpation: facial palpation normal;  Ear  Hearing: intact;  Auricles: bilateral auricles normal;  External Mastoids: bilateral external mastoids normal;  Ear Canals: right ear canal impacted cerumen observed; left ear canal normal; Ear Canal comments: cerumen removed from right ear canal today, postremoval canal relatively normal  Tympanic Membranes: bilateral tympanic membranes normal;  Nose  External Nose: nares patent bilaterally; external nose normal;  Oral Cavity/Oropharynx  Lips: normal;  Teeth: normal;  Gums: gingiva normal;  Tongue: normal;  Oral mucosa: normal;  Hard palate: normal;  Soft palate: normal;  Tonsils: normal;  Base of Tongue: normal;  Posterior pharyngeal wall: normal;  Neck  Neck: large neck circumference present; no neck tenderness present;  Respiratory  Inspection: breathing unlabored; normal breathing rate;  Cardiovascular  Inspection: extremities are warm and well perfused;  Neurovestibular  Mental Status: alert and oriented;           Result Review       RESULTS REVIEW    I have reviewed the patients old records in the chart.             Assessment & Plan  Cerumen in auditory canal on examination         Down syndrome              Patient appears to be doing well since last visit, she is using oil care and does seem to be helping.  She has some very mild mild crusting to external lateral aspect of ear canals but no notable obstruction buildup requires cleaned out today.  I will have her continue oil care for now and plan for recheck again in about 3 to 4 months.      Medical and surgical options were discussed including observation, continued medical management, medication modification, and surgical management. Risks, benefits and alternatives were discussed and questions were answered. After considering the options, the patient decided to proceed with continued medical management.  Call for ear problems, especially change of hearing, ear pain or dizziness.  For the  best results, use nasal sprays every day. It may take a week to build up in the nasal lining and a few more weeks to improve the eustachian tube function.  Protect getting water in the ears. If needed, may use over the counter silicone plugs or a cotton ball coated with vasoline when bathing.  Use hairdryer on a cool setting after bathing.  Use hearing protection in loud noise situations.  Continue oil care to ears bilaterally  Basic water precautions    Call with questions or concerns    Return in about 3 months (around 10/7/2025) for Recheck ears.        Electronically signed by SHERON Colbert 07/07/25 10:46 AM CDT.

## 2025-08-08 ENCOUNTER — OFFICE VISIT (OUTPATIENT)
Dept: OBSTETRICS AND GYNECOLOGY | Age: 25
End: 2025-08-08
Payer: COMMERCIAL

## 2025-08-08 VITALS
HEIGHT: 59 IN | WEIGHT: 191 LBS | BODY MASS INDEX: 38.51 KG/M2 | SYSTOLIC BLOOD PRESSURE: 102 MMHG | DIASTOLIC BLOOD PRESSURE: 74 MMHG

## 2025-08-08 DIAGNOSIS — Q90.9 DOWN SYNDROME: ICD-10-CM

## 2025-08-08 DIAGNOSIS — N93.8 DUB (DYSFUNCTIONAL UTERINE BLEEDING): Primary | ICD-10-CM

## 2025-08-08 DIAGNOSIS — N94.89 MENSTRUAL SUPPRESSION: ICD-10-CM

## 2025-08-08 PROCEDURE — 99213 OFFICE O/P EST LOW 20 MIN: CPT | Performed by: NURSE PRACTITIONER

## 2025-08-08 RX ORDER — NORETHINDRONE ACETATE AND ETHINYL ESTRADIOL AND FERROUS FUMARATE 1.5-30(21)
KIT ORAL
Qty: 28 TABLET | Refills: 13 | Status: SHIPPED | OUTPATIENT
Start: 2025-08-08

## 2025-08-08 RX ORDER — LEVOTHYROXINE SODIUM 137 UG/1
TABLET ORAL
COMMUNITY
Start: 2025-08-07

## 2025-08-08 RX ORDER — NORETHINDRONE ACETATE AND ETHINYL ESTRADIOL AND FERROUS FUMARATE 1.5-30(21)
KIT ORAL
COMMUNITY
Start: 2025-07-30 | End: 2025-08-08 | Stop reason: SDUPTHER